# Patient Record
Sex: FEMALE | Race: BLACK OR AFRICAN AMERICAN | Employment: FULL TIME | URBAN - METROPOLITAN AREA
[De-identification: names, ages, dates, MRNs, and addresses within clinical notes are randomized per-mention and may not be internally consistent; named-entity substitution may affect disease eponyms.]

---
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
## 2023-04-10 NOTE — PLAN OF CARE
Data: [facility] to [facility]: 3/6/2022 11:19 AM.  Reason for maternal/fetal assessment is possible  labor. Patient reports intermittent cramping, lower back pain, and mild pelvic pressure, and some nausea.  She describes burning in her upper abdomen associated with cramping and some uterine tightening.  She has been unable to sleep the last two nights and does not have an appetite.  Patient is a .   Gestational Age 32w3d. VSS. Fetal movement present. Patient denies vaginal bleeding, vomiting, headache, visual disturbances, significant edema. Support person is not present.   Action: Verbal consent for EFM. Triage assessment completed.   Response: Patient verbalized agreement with plan. Will contact Dr Lu Hunter with update and further orders.   MATERNAL FETAL MEDICINE IS FOLLOWING THE PATIENT  FOR   1. Suboxone (outside provider ([facility]))    2. Obesity (BMI 35 ([facility]))        SUBJECTIVE:   The patient reports pain with urination, since last visit    CURRENT MEDICATIONS:  Current Outpatient Medications   Medication Sig Dispense Refill   • Prenatal Vit-Fe Fumarate-FA (Prenatal Vitamins) 28-0.8 MG Tab  30 tablet    • DISPENSE Suboxone 8 mg daily  0     No current facility-administered medications for this visit.       Allergies as of 04/10/2023   • (No Known Allergies)         PHYSICAL EXAMINATION:  VITAL SIGNS:    Visit Vitals  BP 98/60   Ht 5' 8.5\" (1.74 m)   Wt 105.7 kg (233 lb)   LMP  (LMP Unknown)   BMI 34.91 kg/m²       LABORATORY RESULTS SINCE LAST VISIT:  No visits with results within 1 Month(s) from this visit.   Latest known visit with results is:   First OB Visit on 03/06/2023   Component Date Value   • Chlamydia trachomatis by* 03/06/2023 Positive (A)    • Neisseria gonorrhoeae by* 03/06/2023 Negative    • Disclaimer 03/06/2023                      Value:This result contains rich text formatting which cannot be displayed here.       TODAY'S ULTRASOUND SHOWED:  Please see separate report    ASSESSMENT AND PLAN:  A 20w4d pregnancy being followed by [facility] for   PROBLEM 1.    1. Suboxone (outside provider ([facility]))    2. Obesity (BMI 35 ([facility]))    Choroid plexus cyst  The patient was counseled on the following  A. A CPC is a small fluid filled structure within the choroid of the lateral ventricles of the fetal brain.  B. It is seen in 1-2% of second trimester ultrasounds   C. In isolation the risk of T18 is minimal (LR<2)  D. In contrast, if a structural anomaly is present in addition to CPC the LR is 66  E. Patient was counseled that neurodevelopmental outcomes in children with euploidy after a diagnosis of CPCs have not shown differences in neurocognitive ability, motor function or behavior   Note: CPCs (size, complexity, laterality, and persistence does not  modify risk)  Gestational age Care plan   Initial visit  1. Targeted anatomical survey was performed and confirmed CPC was an isolated finding  2. cfDNA was performed prior was negative. Patient was informed CPC is a normal variant of no clinical importance with no indication for follow-up ultrasound imaging or  evaluation  3. cfDNA was not performed prior.Genetic counseling was preformed and cfDNA was offered  Diagnostic testing is not recommended solely for the indication of CPC   Source: [facility] Consult Series#57: Evaluation and management of isolated soft ultrasound markers for aneuploidy in the second trimester  PLAN:  1. UA CNS including urine for chlamydia GC      Today's office visit consisted of  1. Reviewing chart  2. Reviewing today's ultrasound +/- labs  3. Direct patient care   4. Addressing the patients high risk issue(s)  5. Documentation todays office visit  6. Ordering subsequent ultrasound +/- labs

## 2023-11-20 NOTE — DISCHARGE INSTRUCTIONS
Discharge Instruction for Undelivered Patients      You were seen for:  Labor Eval  We Consulted: Dr. Hunter & M   You had (Test or Medicine):  labor treatment     Diet:   Drink 8 to 12 glasses of liquids (milk, juice, water) every day.  You may eat meals and snacks.     Activity:  Rest the pelvic area. No sex. Do not stimulate breasts or nipples.     Call your provider if you notice:  Swelling in your face or increased swelling in your hands or legs.  Headaches that are not relieved by Tylenol (acetaminophen).  Changes in your vision (blurring: seeing spots or stars.)  Nausea (sick to your stomach) and vomiting (throwing up).   Weight gain of 5 pounds or more per week.  Heartburn that doesn't go away.  Signs of bladder infection: pain when you urinate (use the toilet), need to go more often and more urgently.  The bag of chew (rupture of membranes) breaks, or you notice leaking in your underwear.  Bright red blood in your underwear.  Abdominal (lower belly) or stomach pain.  For first baby: Contractions (tightening) less than 5 minutes apart for one hour or more.  *If less than 34 weeks: Contractions (tightening) more than 6 times in one hour.  Increase or change in vaginal discharge (note the color and amount)      Follow-up:  As scheduled in the clinic         Was A Bandage Applied: Yes

## 2024-02-04 ENCOUNTER — HEALTH MAINTENANCE LETTER (OUTPATIENT)
Age: 19
End: 2024-02-04

## 2025-03-02 ENCOUNTER — HEALTH MAINTENANCE LETTER (OUTPATIENT)
Age: 20
End: 2025-03-02